# Patient Record
Sex: MALE | Race: WHITE
[De-identification: names, ages, dates, MRNs, and addresses within clinical notes are randomized per-mention and may not be internally consistent; named-entity substitution may affect disease eponyms.]

---

## 2018-12-16 ENCOUNTER — HOSPITAL ENCOUNTER (OUTPATIENT)
Dept: HOSPITAL 62 - RAD | Age: 68
End: 2018-12-16
Attending: NURSE PRACTITIONER
Payer: MEDICARE

## 2018-12-16 DIAGNOSIS — G89.29: ICD-10-CM

## 2018-12-16 DIAGNOSIS — M25.511: Primary | ICD-10-CM

## 2018-12-16 NOTE — RADIOLOGY REPORT (SQ)
EXAM DESCRIPTION:  SHOULDER RIGHT 2 OR MORE VIEWS



COMPLETED DATE/TIME:  12/16/2018 12:52 pm



REASON FOR STUDY:  M25.511 CHRONIC SHOULDER PAIN RT



COMPARISON:  None.



NUMBER OF VIEWS:  Three views.



TECHNIQUE:  Internal rotation, external rotation, and Y view images acquired of the right shoulder.



LIMITATIONS:  None.



FINDINGS:  MINERALIZATION: Normal.

BONES: No acute fracture or dislocation.  No worrisome bone lesions.

JOINTS: No dislocation.

VISUALIZED LUNGS AND RIBS: No pneumothorax.  No rib fracture.

SOFT TISSUES: No radiopaque foreign body.

OTHER: No other significant finding.



IMPRESSION:   NO RADIOGRAPHIC EVIDENCE OF ACUTE INJURY.



TECHNICAL DOCUMENTATION:  JOB ID:  [id number]

 2011 [employer]- All Rights Reserved



Reading location - IP/workstation name: [email or web address]

## 2020-01-27 ENCOUNTER — HOSPITAL ENCOUNTER (OUTPATIENT)
Dept: HOSPITAL 62 - END | Age: 70
Discharge: HOME | End: 2020-01-27
Attending: INTERNAL MEDICINE
Payer: MEDICARE

## 2020-01-27 VITALS — DIASTOLIC BLOOD PRESSURE: 76 MMHG | SYSTOLIC BLOOD PRESSURE: 117 MMHG

## 2020-01-27 DIAGNOSIS — K52.9: Primary | ICD-10-CM

## 2020-01-27 DIAGNOSIS — N40.0: ICD-10-CM

## 2020-01-27 DIAGNOSIS — E78.5: ICD-10-CM

## 2020-01-27 DIAGNOSIS — Z79.890: ICD-10-CM

## 2020-01-27 DIAGNOSIS — I10: ICD-10-CM

## 2020-01-27 PROCEDURE — 45380 COLONOSCOPY AND BIOPSY: CPT

## 2020-01-27 PROCEDURE — 88305 TISSUE EXAM BY PATHOLOGIST: CPT

## 2020-01-27 NOTE — OPERATIVE REPORT
Operative Report


DATE OF SURGERY: 01/27/20


Operative Report: 





The risk, benefits and alternatives of the procedure including the risk of 

bleeding, perforation requiring surgery have been explained to the patient in 

detail and informed consent has been obtained.  Patient is taken back to the 

endoscopy suite and placed in the left, lateral decubital position.  Timeout was

called.  Propofol medication is administered.  Rectal examination is done which 

did not reveal any masses, tears or fissures.  An Olympus videoscope was 

introduced into the patient's rectum.  Scope was then carefully advanced all the

way to the cecum.  Cecum was identified by the usual anatomical landmarks of the

ileocecal valve as well as the appendiceal office.  Photodocumentation is 

obtained.  Scope was then sequentially pulled back via the various segments of 

the colon including the ascending colon, pelvic flexure, transverse colon, splen

ic flexure, descending colon finding to the rectosigmoid portions of the colon. 

Retroflexion maneuvers performed.


PREOPERATIVE DIAGNOSIS: Colorectal cancer screening


POSTOPERATIVE DIAGNOSIS: Mild inflammation noted on the right and side of the 

colon status post biopsy


OPERATION: Colonoscopy with biopsy


SURGEON: CHUCKIE UNDERWOOD


ANESTHESIA: LMAC


TISSUE REMOVED OR ALTERED: As noted above.


COMPLICATIONS: 





None.


ESTIMATED BLOOD LOSS: None.


INTRAOPERATIVE FINDINGS: As noted above.


PROCEDURE: 





Patient tolerated the procedure well.


No immediate postprocedure complications are noted.


Patient is discharged in good condition.


Discharge date 1/27/2020.  Discharge diet: Regular.





Discharge activity: Regular.


2 to 3-week follow-up to discuss findings.


Patient is instructed to call the office or proceed to the emergency room should

there be any further problems or questions.  Wait on the pathology.





Consider 10-year surveillance colonoscopy if biopsies are negative.